# Patient Record
Sex: MALE | Race: BLACK OR AFRICAN AMERICAN
[De-identification: names, ages, dates, MRNs, and addresses within clinical notes are randomized per-mention and may not be internally consistent; named-entity substitution may affect disease eponyms.]

---

## 2018-04-18 ENCOUNTER — HOSPITAL ENCOUNTER (EMERGENCY)
Dept: HOSPITAL 17 - NEPK | Age: 18
Discharge: HOME | End: 2018-04-18
Payer: SELF-PAY

## 2018-04-18 VITALS — BODY MASS INDEX: 31.56 KG/M2 | HEIGHT: 70 IN | WEIGHT: 220.46 LBS

## 2018-04-18 VITALS
SYSTOLIC BLOOD PRESSURE: 120 MMHG | TEMPERATURE: 98.5 F | HEART RATE: 89 BPM | DIASTOLIC BLOOD PRESSURE: 66 MMHG | RESPIRATION RATE: 17 BRPM | OXYGEN SATURATION: 99 %

## 2018-04-18 DIAGNOSIS — S83.422A: Primary | ICD-10-CM
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
## 2018-04-18 PROCEDURE — 73564 X-RAY EXAM KNEE 4 OR MORE: CPT

## 2018-04-18 PROCEDURE — 99283 EMERGENCY DEPT VISIT LOW MDM: CPT

## 2018-04-18 PROCEDURE — L1830 KO IMMOB CANVAS LONG PRE OTS: HCPCS

## 2018-04-18 NOTE — RADRPT
EXAM DATE/TIME:  04/18/2018 18:44 

 

[facility] COMPARISON:     

No previous studies available for comparison.

 

                     

INDICATIONS :     

Pt states original knee injury occurred 04/17/18 playing basketball when he landed hard on it. Today 
04/18/18 pt was playing basketball again and heard a pop in his LT knee. Pain is in his proximal late
ral side and posterior side.

                     

 

MEDICAL HISTORY :     

None.          

 

SURGICAL HISTORY :     

None.   

 

ENCOUNTER:     

Initial                                        

 

ACUITY:     

2 days      

 

PAIN SCORE:     

8/10

 

LOCATION:     

Left  Knee

 

FINDINGS:     

Four view examination of the left knee demonstrates no evidence of fracture or dislocation.  Bony min
eralization is normal.  The articular surfaces are intact.  The suprapatellar soft tissues have a nor
mal configuration.

 

CONCLUSION:     

Unremarkable examination of the left knee.  

 

 

 

 Mateo Townsend MD on April 18, 2018 at 18:56           

Board Certified Radiologist.

 This report was verified electronically.

## 2018-04-18 NOTE — PD
HPI


Chief Complaint:  Musculoskeletal Complaint


Time Seen by Provider:  18:25


Travel History


International Travel<30 days:  No


Contact w/Intl Traveler<30days:  No


Traveled to known affect area:  No





History of Present Illness


HPI


18-year-old -American male presents emergency department with left 

medial knee pain and swelling.  Patient states he was playing basketball 

yesterday when he came down and twisted and felt something "pop".  He states it 

did not seem so bad until he tried to play basketball again today which 

worsened his symptoms.  He is now having increased pain and swelling and 

difficulty ambulating.  He denies numbness or tingling.  He denies loss of 

function.  Pain is 8 out of 10 worse with movement.  It is also worse with 

weightbearing.  Patient states he has not iced it or tried ibuprofen yet.  He 

has no known drug allergies.





PFSH


Past Medical History


Asthma:  Yes (NO MEDS AT THS TIME)


Diminished Hearing:  No


Immunizations Current:  Yes





Social History


Alcohol Use:  No


Tobacco Use:  No


Substance Use:  No





Allergies-Medications


(Allergen,Severity, Reaction):  


Coded Allergies:  


     No Known Allergies (Verified , 3/4/15)


Reported Meds & Prescriptions





Reported Meds & Active Scripts


Active


Ibuprofen 800 Mg Tab 800 Mg PO Q8H PRN








Review of Systems


Except as stated in HPI:  all other systems reviewed are Neg


General / Constitutional:  No: Fever


Eyes:  No: Visual changes


HENT:  No: Headaches


Cardiovascular:  No: Chest Pain or Discomfort


Respiratory:  No: Shortness of Breath


Gastrointestinal:  No: Abdominal Pain


Genitourinary:  No: Dysuria


Musculoskeletal:  Positive: Arthralgias, Limited ROM, Pain


Skin:  No Rash


Neurologic:  No: Weakness


Psychiatric:  No: Depression


Endocrine:  No: Polydipsia


Hematologic/Lymphatic:  No: Easy Bruising





Physical Exam


Narrative


GENERAL: Patient appears in no obvious distress.


SKIN: Warm and dry.  Normal color.  Normal turgor.


HEAD: Atraumatic. Normocephalic. 


EYES: Pupils equal and round. No scleral icterus. No injection or drainage. 


ENT: No nasal bleeding or discharge.  Mucous membranes pink and moist.


NECK: Trachea midline.  Supple and nontender.


CARDIOVASCULAR: Regular rate and rhythm.  


RESPIRATORY: No accessory muscle use. Clear to auscultation. Breath sounds 

equal bilaterally. 


GASTROINTESTINAL: Abdomen soft, non-tender, nondistended. Hepatic and splenic 

margins not palpable. 


MUSCULOSKELETAL: Extremities without clubbing, cyanosis, or edema. No obvious 

deformities.  Left knee has mild effusion.  Patient has increased pain with 

Lachman's.  No significant laxity appreciated.  Pain is localized to the 

lateral joint line.  Neurovascular exam is normal distally.


NEUROLOGICAL: Awake and alert. No obvious cranial nerve deficits.  Motor 

grossly within normal limits. Five out of 5 muscle strength in the arms and 

legs.  Normal speech.


PSYCHIATRIC: Appropriate mood and affect; insight and judgment normal.





Data


Data


Last Documented VS





Vital Signs








  Date Time  Temp Pulse Resp B/P (MAP) Pulse Ox O2 Delivery O2 Flow Rate FiO2


 


4/18/18 18:20 98.5 89 17 120/66 (84) 99   








Orders





 Orders


Knee, Complete (4vws) (4/18/18 18:27)


Ice/Cold Pack (4/18/18 18:27)


Splint Or Brace Apply/Monitor (4/18/18 18:27)


Ibuprofen (Motrin) (4/18/18 18:30)








MDM


Medical Decision Making


Medical Screen Exam Complete:  Yes


Emergency Medical Condition:  Yes


Differential Diagnosis


Left knee effusion.  Left knee sprain.  Possible fracture


Narrative Course


Patient is given 800 mg ibuprofen p.o.


Ice is applied to the affected area.


Left knee x-rays ordered.


Knee immobilizer is applied.


X-ray shows no acute fracture or dislocation.


Patient is given ibuprofen 800 mg 3 times daily #30.


Patient should wear knee immobilizer and ice frequently for the next week, and 

follow-up with orthopedist if symptoms do not improve.





Diagnosis





 Primary Impression:  


 Left knee sprain


 Qualified Codes:  S83.422A - Sprain of lateral collateral ligament of left knee

, initial encounter


Referrals:  


Orthopedist


as needed


Patient Instructions:  General Instructions, Knee Immobilizer (DC), Swollen 

Knee Joint (ED)





***Additional Instructions:  


Patient is given 800 mg ibuprofen p.o.


Ice is applied to the affected area.


Left knee x-rays ordered.


Knee immobilizer is applied.


X-ray shows no acute fracture or dislocation.


Patient is given ibuprofen 800 mg 3 times daily #30.


Patient should wear knee immobilizer and ice frequently for the next week, and 

follow-up with orthopedist if symptoms do not improve.


***Med/Other Pt SpecificInfo:  Prescription(s) given


Scripts


Ibuprofen (Ibuprofen) 800 Mg Tab


800 MG PO Q8H Y for Pain/Inflammation, #60 TAB 0 Refills


   Prov: Mauricio Craig MD         4/18/18


Disposition:  01 DISCHARGE HOME


Condition:  Stable











Corey Yao Apr 18, 2018 18:26

## 2018-04-25 ENCOUNTER — HOSPITAL ENCOUNTER (EMERGENCY)
Dept: HOSPITAL 17 - NEPD | Age: 18
LOS: 1 days | Discharge: HOME | End: 2018-04-26
Payer: SELF-PAY

## 2018-04-25 VITALS
HEART RATE: 80 BPM | OXYGEN SATURATION: 99 % | TEMPERATURE: 98.7 F | SYSTOLIC BLOOD PRESSURE: 128 MMHG | DIASTOLIC BLOOD PRESSURE: 67 MMHG | RESPIRATION RATE: 18 BRPM

## 2018-04-25 VITALS — WEIGHT: 224.87 LBS | BODY MASS INDEX: 32.19 KG/M2 | HEIGHT: 70 IN

## 2018-04-25 DIAGNOSIS — S83.92XA: Primary | ICD-10-CM

## 2018-04-25 DIAGNOSIS — J45.909: ICD-10-CM
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
## 2018-04-25 PROCEDURE — E0113 CRUTCH UNDERARM EACH WOOD: HCPCS

## 2018-04-25 PROCEDURE — 99283 EMERGENCY DEPT VISIT LOW MDM: CPT

## 2018-04-26 NOTE — PD
HPI


Chief Complaint:  Injury


Time Seen by Provider:  01:47


Travel History


International Travel<30 days:  No


Contact w/Intl Traveler<30days:  No


Traveled to known affect area:  No





History of Present Illness


HPI


18-year-old black male presents emergency department stating that he reinjured 

his left knee earlier today stepping down some stairs.  He was seen here in the 

ER 2 days ago after injuring his knee playing basketball.  He was given a knee 

immobilizer at that time and a prescription for Motrin.  X-rays were negative 

for bony injury.  Patient was advised to wear his brace and limit activity.  

Patient had removed his brace only after 1 day.  He states that he was going 

down a set of stairs and put a lot of pressure down onto his left leg 

reinjuring it.  He states that he had heard a pop.  He denies any other 

injuries.  Pain is moderate.  Worse with weightbearing.  No alleviating factors.





PFSH


Past Medical History


Asthma:  Yes (NO MEDS AT THS TIME)


Diminished Hearing:  No


Immunizations Current:  Yes


Tetanus Vaccination:  < 5 Years


Influenza Vaccination:  No





Past Surgical History


Surgical History:  No Previous Surgery





Social History


Alcohol Use:  No


Tobacco Use:  No


Substance Use:  No





Allergies-Medications


(Allergen,Severity, Reaction):  


Coded Allergies:  


     No Known Allergies (Verified  Adverse Reaction, Unknown, 4/25/18)


Reported Meds & Prescriptions





Reported Meds & Active Scripts


Active


Ibuprofen 800 Mg Tab 800 Mg PO Q8H PRN








Review of Systems


General / Constitutional:  No: Fever


Eyes:  No: Visual changes


HENT:  No: Headaches


Cardiovascular:  No: Chest Pain or Discomfort


Respiratory:  No: Shortness of Breath


Gastrointestinal:  No: Abdominal Pain


Genitourinary:  No: Dysuria


Musculoskeletal:  Positive: Arthralgias, Limited ROM, Edema, Pain, No: Weakness


Skin:  No Rash


Neurologic:  No: Weakness


Psychiatric:  No: Depression


Endocrine:  No: Polydipsia


Hematologic/Lymphatic:  No: Easy Bruising





Physical Exam


Narrative


GENERAL:  Well-developed, well-nourished in no acute distress. Nontoxic 

appearing.


HEAD: Normocephalic, atraumatic.


EYES: Pupils equal round and reactive. Extraocular motions intact. No scleral 

icterus. No injection or drainage. 


ENT: TMs clear without erythema.  The external auditory canals clear.  Nose: 

clear .  Posterior pharynx is pink and moist.  No tonsillar edema or exudate.  

Uvula midline. Airway patent.


NECK: Trachea midline.Supple, nontender, moves head freely.  No central bony 

tenderness or spasm.


CARDIOVASCULAR: Regular rate and rhythm without murmurs, gallops, or rubs. 


RESPIRATORY: Clear to auscultation. Breath sounds equal bilaterally. No wheezes

, rales, or rhonchi.  


GASTROINTESTINAL: Abdomen soft, non-tender, nondistended. No hepato-splenomegaly

, or palpable masses. No guarding.


EXTREMITIES: No clubbing, cyanosis.  Examination of the left lower extremity 

reveals mild swelling in the knee.  There is no anterior posterior drawer.  No 

medial or lateral collateral ligament instability.  Patient complains of pain 

with range of motion.  No pain in the hip, ankle, foot.  He has intact 

sensation with good distal pulses.  The right lower extremity as well as upper 

extremities are without localizing bony tenderness or deformity.


BACK: Nontender without deformity or crepitance. No flank tenderness.





Data


Data


Last Documented VS





Vital Signs








  Date Time  Temp Pulse Resp B/P (MAP) Pulse Ox O2 Delivery O2 Flow Rate FiO2


 


4/25/18 22:56 98.7 80 18 128/67 (87) 99   








Orders





 Orders


Naproxen (Naprosyn) (4/26/18 02:00)


Acetamin-Hydrocod 325-5 Mg (Norco  5-325 (4/26/18 02:00)


Ice/Cold Pack (4/26/18 01:53)


Crutches (4/26/18 01:53)


Ed Discharge Order (4/26/18 02:01)








[facility]


Medical Decision Making


Medical Screen Exam Complete:  Yes


Emergency Medical Condition:  Yes


Medical Record Reviewed:  Yes


Differential Diagnosis


MDM: High


Differential diagnoses: Fracture, sprain, strain, dislocation, contusion, 

neurovascular injury


Narrative Course


I reviewed the patient's x-ray from his last visit.  I do not believe any 

additional imaging today is indicated.  Patient's complaint of injury would not 

be expected to cause a bony injury.  Patient is advised to wear his knee 

immobilizer.  Is given crutches, ice pack, Naprosyn 500 mg and hydrocodone 5 mg 

p.o. and discharged in stable condition.





This is left knee sprain





Diagnosis





 Primary Impression:  


 Left knee sprain


Patient Instructions:  General Instructions, Narcotic given in the ED





***Additional Instructions:  


Rest.


Elevation.


Ice packs for the next 3 days.


Knee immobilizer and crutches.


No weight-bearing and then progress to weight-bearing as tolerated.


Medications as directed


Follow-up with an orthopedist or your doctor in one week.


Return to the ER if any problems


Disposition:  01 DISCHARGE HOME


Condition:  Negrito Goncalves Apr 26, 2018 02:05